# Patient Record
Sex: MALE | Race: BLACK OR AFRICAN AMERICAN
[De-identification: names, ages, dates, MRNs, and addresses within clinical notes are randomized per-mention and may not be internally consistent; named-entity substitution may affect disease eponyms.]

---

## 2019-02-21 ENCOUNTER — HOSPITAL ENCOUNTER (EMERGENCY)
Dept: HOSPITAL 63 - ER | Age: 29
Discharge: ANOTHER HOSPITAL | End: 2019-02-21
Payer: OTHER GOVERNMENT

## 2019-02-21 ENCOUNTER — HOSPITAL ENCOUNTER (INPATIENT)
Dept: HOSPITAL 61 - 4 NORTH | Age: 29
LOS: 2 days | Discharge: HOME | DRG: 343 | End: 2019-02-23
Attending: INTERNAL MEDICINE | Admitting: INTERNAL MEDICINE
Payer: OTHER GOVERNMENT

## 2019-02-21 VITALS — BODY MASS INDEX: 23.1 KG/M2 | HEIGHT: 74 IN | WEIGHT: 180 LBS

## 2019-02-21 VITALS — DIASTOLIC BLOOD PRESSURE: 56 MMHG | SYSTOLIC BLOOD PRESSURE: 112 MMHG

## 2019-02-21 VITALS — DIASTOLIC BLOOD PRESSURE: 73 MMHG | SYSTOLIC BLOOD PRESSURE: 117 MMHG

## 2019-02-21 VITALS — BODY MASS INDEX: 25.03 KG/M2 | WEIGHT: 195 LBS | HEIGHT: 74 IN

## 2019-02-21 VITALS — SYSTOLIC BLOOD PRESSURE: 114 MMHG | DIASTOLIC BLOOD PRESSURE: 68 MMHG

## 2019-02-21 VITALS — DIASTOLIC BLOOD PRESSURE: 58 MMHG | SYSTOLIC BLOOD PRESSURE: 130 MMHG

## 2019-02-21 VITALS — DIASTOLIC BLOOD PRESSURE: 68 MMHG | SYSTOLIC BLOOD PRESSURE: 97 MMHG

## 2019-02-21 VITALS — SYSTOLIC BLOOD PRESSURE: 106 MMHG | DIASTOLIC BLOOD PRESSURE: 55 MMHG

## 2019-02-21 VITALS — SYSTOLIC BLOOD PRESSURE: 122 MMHG | DIASTOLIC BLOOD PRESSURE: 79 MMHG

## 2019-02-21 VITALS — SYSTOLIC BLOOD PRESSURE: 107 MMHG | DIASTOLIC BLOOD PRESSURE: 59 MMHG

## 2019-02-21 VITALS — SYSTOLIC BLOOD PRESSURE: 102 MMHG | DIASTOLIC BLOOD PRESSURE: 52 MMHG

## 2019-02-21 VITALS — DIASTOLIC BLOOD PRESSURE: 75 MMHG | SYSTOLIC BLOOD PRESSURE: 129 MMHG

## 2019-02-21 VITALS — DIASTOLIC BLOOD PRESSURE: 65 MMHG | SYSTOLIC BLOOD PRESSURE: 126 MMHG

## 2019-02-21 DIAGNOSIS — K35.80: Primary | ICD-10-CM

## 2019-02-21 DIAGNOSIS — R79.89: ICD-10-CM

## 2019-02-21 DIAGNOSIS — K38.1: ICD-10-CM

## 2019-02-21 DIAGNOSIS — Z79.899: ICD-10-CM

## 2019-02-21 LAB
ALBUMIN SERPL-MCNC: 4.1 G/DL (ref 3.4–5)
ALP SERPL-CCNC: 39 U/L (ref 46–116)
ALT SERPL-CCNC: 22 U/L (ref 16–63)
AMYLASE SERPL-CCNC: 79 U/L (ref 25–115)
ANION GAP SERPL CALC-SCNC: 8 MMOL/L (ref 6–14)
APTT PPP: YELLOW S
AST SERPL-CCNC: 20 U/L (ref 15–37)
BACTERIA #/AREA URNS HPF: 0 /HPF
BASOPHILS # BLD AUTO: 0.1 X10^3/UL (ref 0–0.2)
BASOPHILS NFR BLD: 1 % (ref 0–3)
BILIRUB DIRECT SERPL-MCNC: 0.2 MG/DL (ref 0–0.2)
BILIRUB SERPL-MCNC: 0.7 MG/DL (ref 0.2–1)
BILIRUB UR QL STRIP: (no result)
CA-I SERPL ISE-MCNC: 13 MG/DL (ref 8–26)
CALCIUM SERPL-MCNC: 9.3 MG/DL (ref 8.5–10.1)
CHLORIDE SERPL-SCNC: 102 MMOL/L (ref 98–107)
CO2 SERPL-SCNC: 30 MMOL/L (ref 21–32)
CREAT SERPL-MCNC: 1.5 MG/DL (ref 0.7–1.3)
EOSINOPHIL NFR BLD: 0.9 X10^3/UL (ref 0–0.7)
EOSINOPHIL NFR BLD: 12 % (ref 0–3)
ERYTHROCYTE [DISTWIDTH] IN BLOOD BY AUTOMATED COUNT: 13.3 % (ref 11.5–14.5)
FIBRINOGEN PPP-MCNC: CLEAR MG/DL
GFR SERPLBLD BASED ON 1.73 SQ M-ARVRAT: 68.4 ML/MIN
GLUCOSE SERPL-MCNC: 97 MG/DL (ref 70–99)
GLUCOSE UR STRIP-MCNC: (no result) MG/DL
HCT VFR BLD CALC: 44.8 % (ref 39–53)
HGB BLD-MCNC: 15 G/DL (ref 13–17.5)
LIPASE: 98 U/L (ref 73–393)
LYMPHOCYTES # BLD: 2 X10^3/UL (ref 1–4.8)
LYMPHOCYTES NFR BLD AUTO: 29 % (ref 24–48)
MCH RBC QN AUTO: 30 PG (ref 25–35)
MCHC RBC AUTO-ENTMCNC: 34 G/DL (ref 31–37)
MCV RBC AUTO: 90 FL (ref 79–100)
MONO #: 0.8 X10^3/UL (ref 0–1.1)
MONOCYTES NFR BLD: 11 % (ref 0–9)
NEUT #: 3.3 X10^3UL (ref 1.8–7.7)
NEUTROPHILS NFR BLD AUTO: 47 % (ref 31–73)
NITRITE UR QL STRIP: (no result)
PLATELET # BLD AUTO: 316 X10^3/UL (ref 140–400)
POTASSIUM SERPL-SCNC: 3.9 MMOL/L (ref 3.5–5.1)
PROT SERPL-MCNC: 8.2 G/DL (ref 6.4–8.2)
RBC # BLD AUTO: 4.98 X10^6/UL (ref 4.3–5.7)
RBC #/AREA URNS HPF: 0 /HPF (ref 0–2)
SODIUM SERPL-SCNC: 140 MMOL/L (ref 136–145)
SP GR UR STRIP: 1.02
SQUAMOUS #/AREA URNS LPF: (no result) /LPF
UROBILINOGEN UR-MCNC: 0.2 MG/DL
WBC # BLD AUTO: 7 X10^3/UL (ref 4–11)
WBC #/AREA URNS HPF: (no result) /HPF (ref 0–4)

## 2019-02-21 PROCEDURE — 96374 THER/PROPH/DIAG INJ IV PUSH: CPT

## 2019-02-21 PROCEDURE — 85025 COMPLETE CBC W/AUTO DIFF WBC: CPT

## 2019-02-21 PROCEDURE — 85610 PROTHROMBIN TIME: CPT

## 2019-02-21 PROCEDURE — 85730 THROMBOPLASTIN TIME PARTIAL: CPT

## 2019-02-21 PROCEDURE — 0DTJ4ZZ RESECTION OF APPENDIX, PERCUTANEOUS ENDOSCOPIC APPROACH: ICD-10-PCS | Performed by: SURGERY

## 2019-02-21 PROCEDURE — 81001 URINALYSIS AUTO W/SCOPE: CPT

## 2019-02-21 PROCEDURE — 82570 ASSAY OF URINE CREATININE: CPT

## 2019-02-21 PROCEDURE — 84156 ASSAY OF PROTEIN URINE: CPT

## 2019-02-21 PROCEDURE — 99285 EMERGENCY DEPT VISIT HI MDM: CPT

## 2019-02-21 PROCEDURE — 88304 TISSUE EXAM BY PATHOLOGIST: CPT

## 2019-02-21 PROCEDURE — 96375 TX/PRO/DX INJ NEW DRUG ADDON: CPT

## 2019-02-21 PROCEDURE — 83690 ASSAY OF LIPASE: CPT

## 2019-02-21 PROCEDURE — 74022 RADEX COMPL AQT ABD SERIES: CPT

## 2019-02-21 PROCEDURE — 82150 ASSAY OF AMYLASE: CPT

## 2019-02-21 PROCEDURE — 74177 CT ABD & PELVIS W/CONTRAST: CPT

## 2019-02-21 PROCEDURE — 36415 COLL VENOUS BLD VENIPUNCTURE: CPT

## 2019-02-21 PROCEDURE — 84300 ASSAY OF URINE SODIUM: CPT

## 2019-02-21 PROCEDURE — 93005 ELECTROCARDIOGRAM TRACING: CPT

## 2019-02-21 PROCEDURE — A7015 AEROSOL MASK USED W NEBULIZE: HCPCS

## 2019-02-21 PROCEDURE — 80048 BASIC METABOLIC PNL TOTAL CA: CPT

## 2019-02-21 PROCEDURE — 80076 HEPATIC FUNCTION PANEL: CPT

## 2019-02-21 RX ADMIN — KETOROLAC TROMETHAMINE SCH MG: 15 INJECTION, SOLUTION INTRAMUSCULAR; INTRAVENOUS at 12:00

## 2019-02-21 RX ADMIN — DEXTROSE, SODIUM CHLORIDE, SODIUM LACTATE, POTASSIUM CHLORIDE, AND CALCIUM CHLORIDE SCH MLS/HR: 5; .6; .31; .03; .02 INJECTION, SOLUTION INTRAVENOUS at 11:28

## 2019-02-21 RX ADMIN — OXYCODONE HYDROCHLORIDE AND ACETAMINOPHEN PRN TAB: 5; 325 TABLET ORAL at 20:57

## 2019-02-21 RX ADMIN — KETOROLAC TROMETHAMINE SCH MG: 15 INJECTION, SOLUTION INTRAMUSCULAR; INTRAVENOUS at 18:47

## 2019-02-21 RX ADMIN — PIPERACILLIN SODIUM AND TAZOBACTAM SODIUM SCH MLS/HR: 3; .375 INJECTION, POWDER, LYOPHILIZED, FOR SOLUTION INTRAVENOUS at 18:47

## 2019-02-21 RX ADMIN — KETOROLAC TROMETHAMINE SCH MG: 15 INJECTION, SOLUTION INTRAMUSCULAR; INTRAVENOUS at 20:55

## 2019-02-21 RX ADMIN — FENTANYL CITRATE PRN MCG: 50 INJECTION INTRAMUSCULAR; INTRAVENOUS at 12:09

## 2019-02-21 RX ADMIN — FENTANYL CITRATE PRN MCG: 50 INJECTION INTRAMUSCULAR; INTRAVENOUS at 12:36

## 2019-02-21 RX ADMIN — PIPERACILLIN SODIUM AND TAZOBACTAM SODIUM SCH MLS/HR: 3; .375 INJECTION, POWDER, LYOPHILIZED, FOR SOLUTION INTRAVENOUS at 10:59

## 2019-02-21 NOTE — NUR
Pt admitted from PACU. A&o X4, VSS, denies pain, lap sites X3 CDI. Completed admission 
assessment. Oriented to room and routines. Tray ordered. Water pitcher filled. Call light 
within reach. Will continue to monitor.

## 2019-02-21 NOTE — RAD
Acute abdomen series with chest, 3 views, 2/21/2019:

 

HISTORY: Right-sided abdominal pain

 

There is gas and stool scattered throughout the colon in a nonspecific 

pattern. No free air is present in the abdomen. There is no evidence of 

organomegaly. 2 coarse calcifications are evident in the right mid pelvis 

compatible with appendicoliths.

 

The heart size is normal. The lungs are clear. There is no evidence of 

pleural fluid.

 

 

IMPRESSION:

1. Right lower quadrant calcifications compatible with appendicoliths

 

2. No acute abdominal abnormality is detected.

 

Electronically signed by: Rick Moritz, MD (2/21/2019 7:39 AM) [facility]

## 2019-02-21 NOTE — PDOC1
History and Physical


Date of Admission


Date of Admission


DATE: 2/21/19 


TIME: 08:51





Identification/Chief Complaint


Chief Complaint


Abdominal pain right lower quadrant





Source


Source:  Patient





History of Present Illness


History of Present Illness


28-year-old male with 2 day history of abdominal pain radiating to the right 

lower quadrant sharp in nature worsening over time. Patient does describe 

nausea no vomiting. Denies any fevers or chills. His never had any surgery. CT 

scan shows dilated appendix with fecalith





Past Medical History


Cardiovascular:  No pertinent hx


Pulmonary:  No pertinent hx


GI:  No pertinent hx


Heme/Onc:  No pertinent hx


Hepatobiliary:  No pertinent hx


Psych:  No pertinent hx


Rheumatologic:  No pertinent hx


ENT:  No pertinent hx


Renal/:  No pertinent hx


Endocrine:  No pertinent hx


Dermatology:  No pertinent hx





Past Surgical History


Past Surgical History:  No pertinent history





Family History


Family History:  No Significant





Social History


Smoke:  No


ALCOHOL:  rare


Drugs:  None





Allergies


Allergies:  


Coded Allergies:  


     No Known Drug Allergies (Unverified , 2/21/19)





ROS


Gastrointestinal:  Yes Nausea, Yes Abdominal Pain





Physical Exam


General:  Alert, Oriented X3, Cooperative, mild distress


HEENT:  Atraumatic, PERRLA, EOMI


Lungs:  Clear to auscultation, Normal air movement


Heart:  RRR, no murmurs


Abdomen:  Normal bowel sounds, Soft, Other (tender to palpation right lower 

quadrant)


Rectal Exam:  not examined


Extremities:  No edema


Skin:  No significant lesion


Neuro:  Normal speech


Psych/Mental Status:  Mental status NL





VTE Prophylaxis Ordered


VTE Prophylaxis Devices:  Yes


VTE Pharmacological Prophylaxi:  Contraindicated





Assessment/Plan


Assessment/Plan


Right lower quadrant abdominal pain with nausea CT scan showing signs 

consistent with acute appendicitis plan for laparoscopic appendectomy today. 

Discussed procedure with the patient including risk benefits possibility of 

open appendectomy.











CINDY SCHMITZ MD Feb 21, 2019 08:54

## 2019-02-21 NOTE — PDOC4
Operative Note


Operative Note


Date: 2/21/2019


Preoperative diagnosis: Acute appendicitis


Operative diagnosis: Same


Surgeon: Billy


Procedure: Laparoscopic appendectomy


Specimen: Appendix


Dictation: Patient is 28-year-old male who is better to the hospital for right 

lower quadrant abdominal pain 24 hours a CT scan showing dilated appendix with 

a fecalith. Procedure of laparoscopic appendectomy was explained to the patient 

detail risk benefits were also discussed including bleeding infection injury to 

intra-abdominal contents possibly necessitating further open operations 

alternatives to this procedure also discussed with the patient who seemed to 

understand and gave both verbal and written consent to have the procedure 

performed. Patient was taken to the operative reports the supine position 

general anesthesia was initiated once patient was sleep and intubated his 

abdomen was prepped and draped usual sterile fashion using ChloraPrep and area 

just above the umbilicus was injected with quarter percent Marcaine with 

epinephrine and incision was made with 11 blade scalpel and a varies needle was 

placed within the abdomen creating a pneumoperitoneum once this was completed 

12 mm port was placed and a 5 mm camera was placed within the abdomen abdomen 

was inspected the appendix was visualized the right lower quadrant which 

appeared to be quite dilated and somewhat inflamed. A 5 mm port was placed in 

the midline low pelvis and a second 5 OmegaPort was placed in the right mid 

abdomen under direct visualization the appendix was grasped and retracted 

towards the anterior abdominal wall window was propagated and base of the 

appendix through the mesoappendix with a [place] dissector once this was 

complete an Endo DAVID stapler using a blue load was used to staple and transect 

the base of the appendix. An stabled and transected with the same Endo DAVID. The 

appendix was placed in the Endo Catch bag and removed from the umbilicus right 

lower quadrant pelvis were irrigated and suctioned dry hemostased and be 

appropriate and the pneumoperitoneum was reduced all ports were removed fascial 

defect at the umbilicus was closed with a figure-of-eight 0 Vicryl suture and 

the skin was reapproximated all port sites for septic or Monocryl Mastisol Steri

-Strips and Band-Aids were applied as dressings. Patient was awakened and 

extubated in the operating room taken to recovery in stable condition all 

sponge instrument needle counts listed as correct isthmic blood loss 5 mL











CINDY SCHMITZ MD Feb 21, 2019 11:28

## 2019-02-21 NOTE — PDOC1
History and Physical


Date of Admission


Date of Admission


DATE: 2/21/19 


TIME: 18:01





Identification/Chief Complaint


Chief Complaint


abd pain





Source


Source:  Chart review, Patient





History of Present Illness


History of Present Illness





Mr. Colvin, 28-year-old male, active [employer],  with 2 day history of 

abdominal pain radiating to the right lower quadrant sharp in nature worsening 

over time.


taken urgently to the OR with Dr. Cervantes due to CT from OSH with dilated 

appendix with fecalith





Past Medical History


Cardiovascular:  No pertinent hx


Pulmonary:  No pertinent hx


GI:  No pertinent hx


Heme/Onc:  No pertinent hx


Hepatobiliary:  No pertinent hx


Psych:  No pertinent hx


Rheumatologic:  No pertinent hx


ENT:  No pertinent hx


Renal/:  No pertinent hx


Endocrine:  No pertinent hx


Dermatology:  No pertinent hx





Past Surgical History


Past Surgical History:  No pertinent history





Family History


Family History:  No Significant





Social History


Smoke:  No


ALCOHOL:  rare


Drugs:  None





Current Medications


Current Medications





Current Medications


Piperacillin Sod/ Tazobactam Sod 3.375 gm/Sodium Chloride 50 ml @  100 mls/hr 

Q6HRS IV  Last administered on 2/21/19at 10:59;  Start 2/21/19 at 10:00


Ondansetron HCl (Zofran) 4 mg PRN Q6HRS  PRN IV NAUSEA/VOMITING;  Start 2/21/19 

at 09:15;  Stop 2/21/19 at 18:00;  Status DC


Fentanyl Citrate (Fentanyl 2ml Vial) 25 mcg PRN Q5MIN  PRN IV MILD PAIN;  Start 

2/21/19 at 09:15;  Stop 2/21/19 at 18:00;  Status DC


Fentanyl Citrate (Fentanyl 2ml Vial) 50 mcg PRN Q5MIN  PRN IV MODERATE TO 

SEVERE PAIN Last administered on 2/21/19at 12:36;  Start 2/21/19 at 09:15;  

Stop 2/21/19 at 18:00;  Status DC


Morphine Sulfate (Morphine Sulfate) 1 mg PRN Q10MIN  PRN IV SEVERE PAIN;  Start 

2/21/19 at 09:15;  Stop 2/21/19 at 18:00;  Status DC


Ringer's Solution 1,000 ml @  30 mls/hr Q24H IV ;  Start 2/21/19 at 09:08;  

Stop 2/21/19 at 21:07


Lidocaine HCl (Xylocaine-Mpf 1% 2ml Vial) 2 ml 1X PRN  PRN ID IV START;  Start 2 /21/19 at 09:15;  Stop 2/21/19 at 18:00;  Status DC


Hydromorphone HCl (Dilaudid) 0.5 mg PRN Q10MIN  PRN IV SEV PAIN, Second choice;

  Start 2/21/19 at 09:15;  Stop 2/21/19 at 18:00;  Status DC


Prochlorperazine Edisylate (Compazine) 5 mg PACU PRN  PRN IV NAUSEA, MRX1;  

Start 2/21/19 at 09:15;  Stop 2/21/19 at 18:00;  Status DC


Lidocaine HCl (Lidocaine Pf 2% Vial) 5 ml STK-MED ONCE .ROUTE ;  Start 2/21/19 

at 09:28;  Stop 2/21/19 at 09:29;  Status DC


Propofol 20 ml @ As Directed STK-MED ONCE IV ;  Start 2/21/19 at 09:28;  Stop 2/ 21/19 at 09:29;  Status DC


Rocuronium Bromide (Zemuron) 50 mg STK-MED ONCE .ROUTE ;  Start 2/21/19 at 09:29

;  Stop 2/21/19 at 09:30;  Status DC


Fentanyl Citrate (Fentanyl 2ml Vial) 100 mcg STK-MED ONCE .ROUTE ;  Start 2/21/ 19 at 09:29;  Stop 2/21/19 at 09:30;  Status DC


Bupivacaine HCl/ Epinephrine Bitart (Sensorcain-Mpf Epi 0.5%-1:[id number]) 30 ml STK

-MED ONCE .ROUTE  Last administered on 2/21/19at 11:07;  Start 2/21/19 at 10:42

;  Stop 2/21/19 at 10:43;  Status DC


Dexamethasone Sodium Phosphate (Decadron) 20 mg STK-MED ONCE .ROUTE ;  Start 2/ 21/19 at 11:02;  Stop 2/21/19 at 11:03;  Status DC


Ondansetron HCl (Zofran) 4 mg STK-MED ONCE .ROUTE ;  Start 2/21/19 at 11:02;  

Stop 2/21/19 at 11:03;  Status DC


Desflurane (Suprane) 15 ml STK-MED ONCE IH ;  Start 2/21/19 at 11:02;  Stop 2/21 /19 at 11:03;  Status DC


Neostigmine Methylsulfate (Bloxiverz) 10 mg STK-MED ONCE .ROUTE ;  Start 2/21/ 19 at 11:04;  Stop 2/21/19 at 11:05;  Status DC


Glycopyrrolate (Robinul) 1 mg STK-MED ONCE .ROUTE ;  Start 2/21/19 at 11:04;  

Stop 2/21/19 at 11:05;  Status DC


Fentanyl Citrate (Fentanyl 2ml Vial) 100 mcg STK-MED ONCE .ROUTE ;  Start 2/21/ 19 at 11:28;  Stop 2/21/19 at 11:29;  Status DC


Prochlorperazine Edisylate (Compazine) 10 mg STK-MED ONCE .ROUTE ;  Start 2/21/ 19 at 11:28;  Stop 2/21/19 at 11:29;  Status DC


Sodium Chloride (Normal Saline Flush) 3 ml QSHIFT  PRN IV AFTER MEDS AND BLOOD 

DRAWS;  Start 2/21/19 at 11:30


Morphine Sulfate (Morphine Sulfate) 2 mg PRN Q3HRS  PRN IV PAIN;  Start 2/21/19 

at 11:30


Oxycodone/ Acetaminophen (Percocet 5/325) 1 tab PRN Q4HRS  PRN PO MILD PAIN, 

1ST CHOICE;  Start 2/21/19 at 11:30


Oxycodone/ Acetaminophen (Percocet 5/325) 2 tab PRN Q4HRS  PRN PO MODERATE PAIN

, SEVERE PAIN;  Start 2/21/19 at 11:30


Ketorolac Tromethamine (Toradol 15mg Vial) 15 mg Q6HRS IV ;  Start 2/21/19 at 12

:00;  Stop 2/23/19 at 11:59


Ondansetron HCl (Zofran) 4 mg PRN Q6HRS  PRN IV NAUSEA, 1ST CHOICE;  Start 2/21/ 19 at 11:30


Dextrose/Lactated Ringer's 1,000 ml @  75 mls/hr [id number] IV ;  Start 2/21/19 at 

11:28


Piperacillin Sod/ Tazobactam Sod 3.375 gm/Sodium Chloride 50 ml @  100 mls/hr 

Q6HRS IV ;  Start 2/21/19 at 12:00;  Status Cancel





Allergies


Allergies:  


Coded Allergies:  


     No Known Drug Allergies (Unverified , 2/21/19)





ROS


General:  No: Chills, Night Sweats, Fatigue, Malaise, Appetite, Other


PSYCHOLOGICAL ROS:  No: Anxiety, Behavioral Disorder, Concentration difficultie

, Decreased libido, Depression, Disorientation, Hallucinations, Hostility, 

Irritablity, Memory difficulties, Mood Swings, Obsessive thoughts, Physical 

abuse, Sexual abuse, Sleep disturbances, Suicidal ideation, Other


Eyes:  No Blurry vision, No Decreased vision, No Double vision, No Dry eyes, No 

Excessive tearing, No Eye Pain, No Itchy Eyes, No Loss of vision, No Photophobia

, No Scotomata, No Uses contacts, No Uses glasses, No Other


HEENT:  No: Heacaches, Visual Changes, Hearing change, Nasal congestion, Nasal 

discharge, Oral lesions, Sinus pain, Sore Throat, Epistaxis, Sneezing, Snoring, 

Tinnitus, Vertigo, Vocal changes, Other


Respiratory:  No: Cough, Hemoptysis, Orthopnea, Pleuritic Pain, Shortness of 

breath, SOB with excertion, Sputum Changes, Stridor, Tachypnea, Wheezing, Other


Cardiovascular:  No Chest Pain, No Palpitations, No Orthopnea, No Paroxysmal 

Noc. Dyspnea, No Edema, No Lt Headedness, No Other


Gastrointestinal:  No Nausea, No Vomiting, No Abdominal Pain, No Diarrhea, No 

Constipation, No Melena, No Hematochezia, No Other


Genitourinary:  No Dysuria, No Frequency, No Incontinence, No Hematuria, No 

Retention, No Discharge, No Urgency, No Pain, No Flank Pain, No Other, No , No 

, No , No , No , No , No 


Musculoskeletal:  No Gait Disturbance, No Joint Pain, No Joint Stiffness, No 

Joint Swelling, No Muscle Pain, No Muscular Weakness, No Pain In:, No Swelling 

In:, No Other


Skin:  No Dry Skin, No Eczema, No Hair Changes, No Lumps, No Mole Changes, No 

Mottling, No Nail Changes, No Pruritus, No Rash, No Skin Lesion Changes, No 

Other, No Acne





Physical Exam


General:  Alert, Oriented X3, Cooperative, No acute distress


HEENT:  Atraumatic


Lungs:  Normal air movement


Extremities:  No clubbing, No cyanosis, No edema, Normal pulses


Neuro:  Sensation intact, Cranial nerves 3-12 NL


Psych/Mental Status:  Mood NL





Vitals


Vitals





Vital Signs








  Date Time  Temp Pulse Resp B/P (MAP) Pulse Ox O2 Delivery O2 Flow Rate FiO2


 


2/21/19 16:00  55 20 130/58 (82) 96 Room Air  


 


2/21/19 13:17 98.4       





 98.4       


 


2/21/19 12:36       10.0 











VTE Prophylaxis Ordered


VTE Prophylaxis Devices:  Yes


VTE Pharmacological Prophylaxi:  Contraindicated





Assessment/Plan


Assessment/Plan


acute appendicitis,  to OR,   


obs











VERENICE BISHOP MD Feb 21, 2019 18:02

## 2019-02-21 NOTE — RAD
INDICATION: Right sided abdominal pain, nausea<BR>Gave Omni 300 75ml iv 

and Omni 240 30ml oral

 

COMPARISON: None.

 

TECHNIQUE: 

 

Axial CT images were obtained through the abdomen and pelvis with 

intravenous contrast.  

 

One or more of the following individualized dose reduction techniques were

utilized for this examination:  1. Automated exposure control;  2. 

Adjustment of the mA and/or kV according to patient size;  3. Use of 

iterative reconstruction technique.

 

FINDINGS:

 

Chest Base: Partially imaged without gross abnormality.

Vessels: No abdominal aortic aneurysm.

Liver/Biliary: No intrahepatic biliary duct dilation.

Pancreas: No peripancreatic edema.

Spleen: Normal.

Kidneys/Adrenal: No hydronephrosis.

Bladder: No definite adjacent inflammation.

GI: Small amount of free fluid within the pelvis.

Blind-ending tubular structure right lower quadrant of the abdomen 

measuring up to 16 mm with calcific density structure within. Prominent 

enhancement of the wall. Cannot evaluate for adjacent inflammatory changes

given the lack of adjacent fat.

There are some degenerative changes of the spine with mild osteophyte 

formation as well as disc protrusions.

 

IMPRESSION:

 

1.   Blind-ending tubular structure right lower quadrant which is 

concerning for a dilated appendix with appendicoliths within. Cannot 

evaluate for adjacent inflammatory changes given the lack of fat within 

the region but this is concerning for appendicitis. There is also some 

free fluid within the pelvis which is an abnormal finding for male 

patients.

 

Electronically signed by: Gilbert Keenan MD (2/21/2019 7:25 AM) [facility]

## 2019-02-21 NOTE — ED.ADGEN
Past History


Past Medical History:  No Pertinent History


 (JAXON COBOS MD)


Past Surgical History


Circumcision


 (JAXON COBOS MD)


Alcohol Use:  None


Drug Use:  None


 (JAXON COBOS MD)





Adult General


Chief Complaint


Chief Complaint


".. I ve been letitia hurting since yesterday... I ve been nauseate.. want to 

throw up.. pain letitia down here on the right... "


 (JAXON COBOS MD)





HPI


HPI





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  

with complaints of abdomen pain primarily on right lower quadrant.  Pt. has 

complaints of nausea.   Pt. states he last ate chicken last night before going 

to bed.  Patient denies any tarry or dark stools. No recent travel. Was 

assigned to [place] for approximately 10 months and return state side in November 

of this past year.  No hx of prior GI problems.  Pt.   has had  hx 1 previous 

chlamydia that was treated.  No recent discharge or concerns.   Patient denies 

any trauma. Patient up-to-date with vaccinations. No specific ill contacts.  No 

family hx of colitis or renal stones.  


 (JAXON COBOS MD)





Review of Systems


Review of Systems





Constitutional: Denies fever or chills []


Eyes: Denies change in visual acuity, redness, or eye pain []


HENT: Denies nasal congestion or sore throat []


Respiratory: Denies cough or shortness of breath []


Cardiovascular: No additional information not addressed in HPI []


GI: Complaints of right lower abdominal pain, nausea,. Patient denies vomiting, 

bloody stools or diarrhea []


: Denies dysuria or hematuria []


Musculoskeletal: Denies back pain or joint pain []


Integument: Denies rash or skin lesions []


Neurologic: Denies headache, focal weakness or sensory changes []


Endocrine: Denies polyuria or polydipsia []





All other systems were reviewed and found to be within normal limits, except as 

documented in this note.


 (JAXON COBOS MD)





Family History


Family History


Noncontributory


 (JAXON COBOS MD)





Current Medications


Current Medications





Current Medications








 Medications


  (Trade)  Dose


 Ordered  Sig/Katerina  Start Time


 Stop Time Status Last Admin


Dose Admin


 


 Famotidine


  (Pepcid Vial)  20 mg  1X  ONCE  2/21/19 05:15


 2/21/19 05:50 DC 2/21/19 05:54


20 MG


 


 Info


  (Do NOT chart on


 this entry -- for


 MONITORING)  1 each  PRN DAILY  PRN  2/21/19 06:00


 2/23/19 05:59   





 


 Iohexol


  (Omnipaque 240


 Mg/ml)  50 ml  1X  ONCE  2/21/19 05:45


 2/21/19 05:50 DC 2/21/19 06:27


50 ML


 


 Iohexol


  (Omnipaque 300


 Mg/ml)  75 ml  1X  ONCE  2/21/19 05:45


 2/21/19 05:50 DC 2/21/19 06:27


75 ML


 


 Ketorolac


 Tromethamine


  (Toradol 30mg


 Vial)  30 mg  1X  ONCE  2/21/19 05:15


 2/21/19 05:50 DC 2/21/19 05:55


30 MG


 


 Lactated Ringer's  1,000 ml @ 


 1,000 mls/hr  Q1H  2/21/19 05:02


 2/21/19 06:01 DC 2/21/19 05:34


1,000 MLS/HR


 


 Ondansetron HCl


  (Zofran)  8 mg  1X  ONCE  2/21/19 05:15


 2/21/19 05:50 DC 2/21/19 05:54


8 MG





 (KINGSLEY MANZO DO)


Current Medications


Noncontributory


 (JAXON COBOS MD)





Allergies


Allergies





Allergies








Coded Allergies Type Severity Reaction Last Updated Verified


 


  No Known Drug Allergies    2/21/19 No





 (KINGSLEY MANZO DO)


Allergies


No known drug allergies


 (JAXON COBOS MD)





Physical Exam


Physical Exam





Constitutional: Well developed, well nourished, moderately acute distress, non-

toxic appearance. []


HENT: Normocephalic, atraumatic, bilateral external ears normal, oropharynx 

moist, no oral exudates, nose normal. []


Eyes: PERRLA, EOMI, conjunctiva normal, no discharge. [] 


Neck: Normal range of motion, no tenderness, supple, no stridor. [] 


Cardiovascular: Bradycardia Heart rate regular rhythm, no murmur []


Lungs & Thorax:  Bilateral breath sounds equal apex on auscultation []


Abdomen: Bowel sounds normal, soft, some right lower quadrant tenderness, no 

masses, no pulsatile masses. [] Some rebound to right lower quadrant.  

Circumcised male. Testicles nontender. No penile discharge.


Skin: Warm, dry, no erythema, no rash. [] Multiple tattoos 


Back: No tenderness, no CVA tenderness. [] 


Extremities: No tenderness, no cyanosis, no clubbing, ROM intact, no edema. [] 

Mild psoas on right.  Muscular.


Neurologic: Alert and oriented X 3, normal motor function, normal sensory 

function, no focal deficits noted. []


Psychologic: Affect anxious, judgement normal, mood normal. []


 (JAXON COBOS MD)





Current Patient Data


Vital Signs





 Vital Signs








  Date Time  Temp Pulse Resp B/P (MAP) Pulse Ox O2 Delivery O2 Flow Rate FiO2


 


2/21/19 07:00  47 18 129/75 (93) 100 Room Air  


 


2/21/19 05:10 98.5       





 ([facility])


Lab Results





 Laboratory Tests








Test


 2/21/19


05:10 2/21/19


05:15


 


Urine Collection Type Unknown   


 


Urine Color Yellow   


 


Urine Clarity Clear   


 


Urine pH 7.0   


 


Urine Specific Gravity 1.020   


 


Urine Protein


 Neg


(NEG-TRACE) 





 


Urine Glucose (UA)


 Neg mg/dL


(NEG) 





 


Urine Ketones (Stick)


 Neg mg/dL


(NEG) 





 


Urine Blood Neg (NEG)   


 


Urine Nitrite Neg (NEG)   


 


Urine Bilirubin Neg (NEG)   


 


Urine Urobilinogen Dipstick


 0.2 mg/dL (0.2


mg/dL) 





 


Urine Leukocyte Esterase Neg (NEG)   


 


Urine RBC 0 /HPF (0-2)   


 


Urine WBC


 Rare /HPF


(0-4) 





 


Urine Squamous Epithelial


Cells Occ /LPF  


 





 


Urine Bacteria


 0 /HPF (0-FEW)


 





 


Urine Mucus Slight /LPF   


 


White Blood Count


 


 7.0 x10^3/uL


(4.0-11.0)


 


Red Blood Count


 


 4.98 x10^6/uL


(4.30-5.70)


 


Hemoglobin


 


 15.0 g/dL


(13.0-17.5)


 


Hematocrit


 


 44.8 %


(39.0-53.0)


 


Mean Corpuscular Volume


 


 90 fL ()





 


Mean Corpuscular Hemoglobin  30 pg (25-35)  


 


Mean Corpuscular Hemoglobin


Concent 


 34 g/dL


(31-37)


 


Red Cell Distribution Width


 


 13.3 %


(11.5-14.5)


 


Platelet Count


 


 316 x10^3/uL


(140-400)


 


Neutrophils (%) (Auto)  47 % (31-73)  


 


Lymphocytes (%) (Auto)  29 % (24-48)  


 


Monocytes (%) (Auto)  11 % (0-9)  H


 


Eosinophils (%) (Auto)  12 % (0-3)  H


 


Basophils (%) (Auto)  1 % (0-3)  


 


Neutrophils # (Auto)


 


 3.3 x10^3uL


(1.8-7.7)


 


Lymphocytes # (Auto)


 


 2.0 x10^3/uL


(1.0-4.8)


 


Monocytes # (Auto)


 


 0.8 x10^3/uL


(0.0-1.1)


 


Eosinophils # (Auto)


 


 0.9 x10^3/uL


(0.0-0.7)  H


 


Basophils # (Auto)


 


 0.1 x10^3/uL


(0.0-0.2)


 


Prothrombin Time


 


 10.7 SEC


(9.4-11.4)


 


Prothrombin Time INR  1.1 (0.9-1.1)  


 


PTT


 


 26 SEC (23-33)





 


Sodium Level


 


 140 mmol/L


(136-145)


 


Potassium Level


 


 3.9 mmol/L


(3.5-5.1)


 


Chloride Level


 


 102 mmol/L


()


 


Carbon Dioxide Level


 


 30 mmol/L


(21-32)


 


Anion Gap  8 (6-14)  


 


Blood Urea Nitrogen


 


 13 mg/dL


(8-26)


 


Creatinine


 


 1.5 mg/dL


(0.7-1.3)  H


 


Estimated GFR


(Cockcroft-Gault) 


 68.4  





 


Glucose Level


 


 97 mg/dL


(70-99)


 


Calcium Level


 


 9.3 mg/dL


(8.5-10.1)


 


Total Bilirubin


 


 0.7 mg/dL


(0.2-1.0)


 


Direct Bilirubin


 


 0.2 mg/dL


(0.0-0.2)


 


Aspartate Amino Transferase


(AST) 


 20 U/L (15-37)





 


Alanine Aminotransferase (ALT)


 


 22 U/L (16-63)





 


Alkaline Phosphatase


 


 39 U/L


()  L


 


Total Protein


 


 8.2 g/dL


(6.4-8.2)


 


Albumin


 


 4.1 g/dL


(3.4-5.0)


 


Amylase Level


 


 79 U/L


()


 


Lipase


 


 98 U/L


()





 (ABBY,KINGSLEY DO)


Lab Results





 Laboratory Tests








Test


 2/21/19


05:10 2/21/19


05:15


 


Urine Collection Type Unknown   


 


Urine Color Yellow   


 


Urine Clarity Clear   


 


Urine pH 7.0   


 


Urine Specific Gravity 1.020   


 


Urine Protein


 Neg


(NEG-TRACE) 





 


Urine Glucose (UA)


 Neg mg/dL


(NEG) 





 


Urine Ketones (Stick)


 Neg mg/dL


(NEG) 





 


Urine Blood Neg (NEG)   


 


Urine Nitrite Neg (NEG)   


 


Urine Bilirubin Neg (NEG)   


 


Urine Urobilinogen Dipstick


 0.2 mg/dL (0.2


mg/dL) 





 


Urine Leukocyte Esterase Neg (NEG)   


 


Urine RBC 0 /HPF (0-2)   


 


Urine WBC


 Rare /HPF


(0-4) 





 


Urine Squamous Epithelial


Cells Occ /LPF  


 





 


Urine Bacteria


 0 /HPF (0-FEW)


 





 


Urine Mucus Slight /LPF   


 


White Blood Count


 


 7.0 x10^3/uL


(4.0-11.0)


 


Red Blood Count


 


 4.98 x10^6/uL


(4.30-5.70)


 


Hemoglobin


 


 15.0 g/dL


(13.0-17.5)


 


Hematocrit


 


 44.8 %


(39.0-53.0)


 


Mean Corpuscular Volume


 


 90 fL ()





 


Mean Corpuscular Hemoglobin  30 pg (25-35)  


 


Mean Corpuscular Hemoglobin


Concent 


 34 g/dL


(31-37)


 


Red Cell Distribution Width


 


 13.3 %


(11.5-14.5)


 


Platelet Count


 


 316 x10^3/uL


(140-400)


 


Neutrophils (%) (Auto)  47 % (31-73)  


 


Lymphocytes (%) (Auto)  29 % (24-48)  


 


Monocytes (%) (Auto)  11 % (0-9)  H


 


Eosinophils (%) (Auto)  12 % (0-3)  H


 


Basophils (%) (Auto)  1 % (0-3)  


 


Neutrophils # (Auto)


 


 3.3 x10^3uL


(1.8-7.7)


 


Lymphocytes # (Auto)


 


 2.0 x10^3/uL


(1.0-4.8)


 


Monocytes # (Auto)


 


 0.8 x10^3/uL


(0.0-1.1)


 


Eosinophils # (Auto)


 


 0.9 x10^3/uL


(0.0-0.7)  H


 


Basophils # (Auto)


 


 0.1 x10^3/uL


(0.0-0.2)


 


Prothrombin Time


 


 10.7 SEC


(9.4-11.4)


 


Prothrombin Time INR  1.1 (0.9-1.1)  


 


PTT


 


 26 SEC (23-33)





 


Sodium Level


 


 140 mmol/L


(136-145)


 


Potassium Level


 


 3.9 mmol/L


(3.5-5.1)


 


Chloride Level


 


 102 mmol/L


()


 


Carbon Dioxide Level


 


 30 mmol/L


(21-32)


 


Anion Gap  8 (6-14)  


 


Blood Urea Nitrogen


 


 13 mg/dL


(8-26)


 


Creatinine


 


 1.5 mg/dL


(0.7-1.3)  H


 


Estimated GFR


(Cockcroft-Gault) 


 68.4  





 


Glucose Level


 


 97 mg/dL


(70-99)


 


Calcium Level


 


 9.3 mg/dL


(8.5-10.1)


 


Total Bilirubin


 


 0.7 mg/dL


(0.2-1.0)


 


Direct Bilirubin


 


 0.2 mg/dL


(0.0-0.2)


 


Aspartate Amino Transferase


(AST) 


 20 U/L (15-37)





 


Alanine Aminotransferase (ALT)


 


 22 U/L (16-63)





 


Alkaline Phosphatase


 


 39 U/L


()  L


 


Total Protein


 


 8.2 g/dL


(6.4-8.2)


 


Albumin


 


 4.1 g/dL


(3.4-5.0)


 


Amylase Level


 


 79 U/L


()


 


Lipase


 


 98 U/L


()








 (JAXON COBOS MD)





EKG


EKG


My interpretation EKG shows a sinus bradycardia at 87 bpm. There is very mild 

right bundle-branch block. But no findings acute STEMI of contralateral changes 

[]


 (JAXON COBOS MD)





Radiology/Procedures


Radiology/Procedures


My interpretation of abdomen film shows no acute cardiopulmonary findings. No 

free air in the diaphragm. Nonspecific bowel gas pattern .[]


 (JAXON COBOS MD)


Radiology/Procedures


Axial CT images were obtained through the abdomen and pelvis with 


intravenous contrast.  


 


One or more of the following individualized dose reduction techniques were


utilized for this examination:  1. Automated exposure control;  2. 


Adjustment of the mA and/or kV according to patient size;  3. Use of 


iterative reconstruction technique.


 


FINDINGS:


 


Chest Base: Partially imaged without gross abnormality.


Vessels: No abdominal aortic aneurysm.


Liver/Biliary: No intrahepatic biliary duct dilation.


Pancreas: No peripancreatic edema.


Spleen: Normal.


Kidneys/Adrenal: No hydronephrosis.


Bladder: No definite adjacent inflammation.


GI: Small amount of free fluid within the pelvis.


Blind-ending tubular structure right lower quadrant of the abdomen 


measuring up to 16 mm with calcific density structure within. Prominent 


enhancement of the wall. Cannot evaluate for adjacent inflammatory changes


given the lack of adjacent fat.


There are some degenerative changes of the spine with mild osteophyte 


formation as well as disc protrusions.


 


IMPRESSION:


 


1.   Blind-ending tubular structure right lower quadrant which is 


concerning for a dilated appendix with appendicoliths within. Cannot 


evaluate for adjacent inflammatory changes given the lack of fat within 


the region but this is concerning for appendicitis. There is also some 


free fluid within the pelvis which is an abnormal finding for male 


patients.


 


Electronically signed by: Gilbert Keenan MD (2/21/2019 7:25 AM) [facility]


 (KINGSLEY MANZO DO)





Course & Med Decision Making


Course & Med Decision Making


Pertinent Labs and Imaging studies reviewed. (See chart for details). 





CT and Labs pending at shift change.  Check pt. to Dr. Manzo





[]


 (JAXON COBOS MD)


Course & Med Decision Making


Dr. Manzo's note


Received patient at 6 AM, agree with previous H&P. Patient has been able to 

tolerate oral contrast and at 6:30 is still awaiting CT scan of his abdomen and 

pelvis.





At approximately 725 called by radiology that patient has what appears to be an 

11 mm appendix with appendicoliths however he has very little intrinsic fat so 

unable to see if there is any inflammation. Due to this and the classic story 

for appendicitis, consultation was made with the surgical service, Dr. Cervantes, 

at [facility] and he graciously accepted the patient.


 (KINGSLEY MNAZO DO)


Final Impression


Final Impression


1. Abdomen pain []


 (JAXON COBOS MD)


Final Impression


Acute appendicitis


 (KINGSLEY MANZO DO)


Dragon Disclaimer


Dragon Disclaimer


This electronic medical record was generated, in whole or in part, using a 

voice recognition dictation system.


 (JAXON COBOS MD)











JAXON COBOS MD Feb 21, 2019 04:55


KINGSLEY MANZO DO Feb 21, 2019 06:34

## 2019-02-21 NOTE — EKG
[facility] [address], [place] [address]

Test Date:    2019               Test Time:    05:32:03

Pat Name:     YAZMIN MORRIS            Department:   

Patient ID:   [id number]           Room:          

Gender:       M                        Technician:   

:          [id number]               Requested By: JAXON COBOS

Order Number: [id number]            Reading MD:   Anam Rosario

                                 Measurements

Intervals                              Axis          

Rate:         57                       P:            45

WV:           152                      QRS:          89

QRSD:         84                       T:            22

QT:           390                                    

QTc:          382                                    

                           Interpretive Statements

SINUS RHYTHM

INCOMPLETE RIGHT BUNDLE BRANCH BLOCK





Electronically Signed On 2019 10:41:17 CST by Anam Rosario

## 2019-02-22 VITALS — SYSTOLIC BLOOD PRESSURE: 125 MMHG | DIASTOLIC BLOOD PRESSURE: 44 MMHG

## 2019-02-22 VITALS — DIASTOLIC BLOOD PRESSURE: 66 MMHG | SYSTOLIC BLOOD PRESSURE: 113 MMHG

## 2019-02-22 VITALS — DIASTOLIC BLOOD PRESSURE: 74 MMHG | SYSTOLIC BLOOD PRESSURE: 115 MMHG

## 2019-02-22 VITALS — DIASTOLIC BLOOD PRESSURE: 57 MMHG | SYSTOLIC BLOOD PRESSURE: 119 MMHG

## 2019-02-22 VITALS — SYSTOLIC BLOOD PRESSURE: 122 MMHG | DIASTOLIC BLOOD PRESSURE: 80 MMHG

## 2019-02-22 VITALS — SYSTOLIC BLOOD PRESSURE: 125 MMHG | DIASTOLIC BLOOD PRESSURE: 79 MMHG

## 2019-02-22 LAB
BASOPHILS # BLD AUTO: 0 X10^3/UL (ref 0–0.2)
BASOPHILS NFR BLD: 0 % (ref 0–3)
EOSINOPHIL NFR BLD: 0 % (ref 0–3)
EOSINOPHIL NFR BLD: 0 X10^3/UL (ref 0–0.7)
ERYTHROCYTE [DISTWIDTH] IN BLOOD BY AUTOMATED COUNT: 13.2 % (ref 11.5–14.5)
HCT VFR BLD CALC: 39.5 % (ref 39–53)
HGB BLD-MCNC: 13 G/DL (ref 13–17.5)
LYMPHOCYTES # BLD: 1.1 X10^3/UL (ref 1–4.8)
LYMPHOCYTES NFR BLD AUTO: 8 % (ref 24–48)
MCH RBC QN AUTO: 30 PG (ref 25–35)
MCHC RBC AUTO-ENTMCNC: 33 G/DL (ref 31–37)
MCV RBC AUTO: 90 FL (ref 79–100)
MONO #: 1 X10^3/UL (ref 0–1.1)
MONOCYTES NFR BLD: 7 % (ref 0–9)
NEUT #: 12.2 X10^3UL (ref 1.8–7.7)
NEUTROPHILS NFR BLD AUTO: 85 % (ref 31–73)
PLATELET # BLD AUTO: 272 X10^3/UL (ref 140–400)
RBC # BLD AUTO: 4.39 X10^6/UL (ref 4.3–5.7)
WBC # BLD AUTO: 14.4 X10^3/UL (ref 4–11)

## 2019-02-22 RX ADMIN — Medication SCH CAP: at 20:46

## 2019-02-22 RX ADMIN — KETOROLAC TROMETHAMINE SCH MG: 15 INJECTION, SOLUTION INTRAMUSCULAR; INTRAVENOUS at 18:18

## 2019-02-22 RX ADMIN — PIPERACILLIN SODIUM AND TAZOBACTAM SODIUM SCH MLS/HR: 3; .375 INJECTION, POWDER, LYOPHILIZED, FOR SOLUTION INTRAVENOUS at 12:13

## 2019-02-22 RX ADMIN — OXYCODONE HYDROCHLORIDE AND ACETAMINOPHEN PRN TAB: 5; 325 TABLET ORAL at 09:21

## 2019-02-22 RX ADMIN — DEXTROSE, SODIUM CHLORIDE, SODIUM LACTATE, POTASSIUM CHLORIDE, AND CALCIUM CHLORIDE SCH MLS/HR: 5; .6; .31; .03; .02 INJECTION, SOLUTION INTRAVENOUS at 00:48

## 2019-02-22 RX ADMIN — KETOROLAC TROMETHAMINE SCH MG: 15 INJECTION, SOLUTION INTRAMUSCULAR; INTRAVENOUS at 05:59

## 2019-02-22 RX ADMIN — OXYCODONE HYDROCHLORIDE AND ACETAMINOPHEN PRN TAB: 5; 325 TABLET ORAL at 20:49

## 2019-02-22 RX ADMIN — DEXTROSE, SODIUM CHLORIDE, SODIUM LACTATE, POTASSIUM CHLORIDE, AND CALCIUM CHLORIDE SCH MLS/HR: 5; .6; .31; .03; .02 INJECTION, SOLUTION INTRAVENOUS at 14:08

## 2019-02-22 RX ADMIN — PIPERACILLIN SODIUM AND TAZOBACTAM SODIUM SCH MLS/HR: 3; .375 INJECTION, POWDER, LYOPHILIZED, FOR SOLUTION INTRAVENOUS at 00:56

## 2019-02-22 RX ADMIN — KETOROLAC TROMETHAMINE SCH MG: 15 INJECTION, SOLUTION INTRAMUSCULAR; INTRAVENOUS at 12:11

## 2019-02-22 RX ADMIN — PIPERACILLIN SODIUM AND TAZOBACTAM SODIUM SCH MLS/HR: 3; .375 INJECTION, POWDER, LYOPHILIZED, FOR SOLUTION INTRAVENOUS at 05:56

## 2019-02-22 RX ADMIN — PIPERACILLIN SODIUM AND TAZOBACTAM SODIUM SCH MLS/HR: 3; .375 INJECTION, POWDER, LYOPHILIZED, FOR SOLUTION INTRAVENOUS at 18:16

## 2019-02-22 NOTE — PATHOLOGY
[facility] Accession Number: [id number]

.                                                                01

Material submitted:                                        .

APPENDIX

.                                                                01

Clinical history:                                          .

Appendicitis.

.                                                                02

**********************************************************************

Diagnosis:

Appendix, appendectomy:

- Acute appendicitis.

(SK/db; 2/22/2019)

LBQ/02/22/2019

**********************************************************************

.                                                                02

Electronically signed:                                     .

Lalit Huddleston MD, Pathologist

NPI- [id number]

.                                                                01

Gross description:                                         .

Received in formalin labeled "Vinicius, Anton, appendix" is an appendectomy

specimen measuring 7.3 cm in length and ranging from 1.1-1.6 cm in

diameter.  There is an attached portion of mesoappendix measuring 4.3 x

1.1 x 0.5 cm.  The proximal margin is closed with a staple line.  The

serosa is pink-red and focally hemorrhagic.  The specimen is serially

sectioned to reveal a dilated lumen ranging from 0.5-0.9 cm.  A tan-brown

fecalith is present in the distal tip measuring 1.3 cm in greatest

dimension.  No perforations are identified.  Representative sections are

submitted in cassette A1-A2, with the proximal margin inked black. ([facility];

2/21/2019)

SYC/SYC

.                                                                02

Pathologist provided ICD-10:

K35.80

.                                                                02

CPT                                                        .

174734

Specimen Comment: A courtesy copy of this report has been sent to

Specimen Comment: [phone], [phone].

Specimen Comment: Report sent to  / DR STARR

Specimen Comment: A duplicate report has been generated due to demographic updates.

***Performed at:  01

   [address], [place], [place]  [id number]

   MD Enzo Bush MD Phone:  [phone]

***Performed at:  02

   [address]  [id number]

   MD Bg Jauregui MD Phone:  [phone]

## 2019-02-22 NOTE — NUR
SW reviewed pt's medical chart and evaluated for dc needs. Pt is active duty  and 
was admitted for appendicitis. There are no dc needs at this time. SW will be available if 
pt's condition changes and dc planning is required.

## 2019-02-22 NOTE — PDOC
KYLER REN APRN 2/22/19 0944:


SURGICAL PROGRESS NOTE


Subjective


tolerating diet


pain managed


Vital Signs





Vital Signs








  Date Time  Temp Pulse Resp B/P (MAP) Pulse Ox O2 Delivery O2 Flow Rate FiO2


 


2/22/19 09:21      Room Air  


 


2/22/19 07:00 97.9 57 16 119/57 (77) 98   





 97.9       


 


2/21/19 12:36       10.0 








I&O











Intake and Output 


 


 2/22/19





 07:00


 


Intake Total 2080 ml


 


Output Total 1010 ml


 


Balance 1070 ml


 


 


 


Intake Oral 1230 ml


 


IV Total 850 ml


 


Output Urine Total 1000 ml


 


Estimated Blood Loss 10 ml


 


# Voids 3








General:  Alert, Oriented X3, Cooperative, No acute distress


Abdomen:  Soft, No tenderness


Labs





Laboratory Tests








Test


 2/22/19


05:30


 


White Blood Count


 14.4 x10^3/uL


(4.0-11.0)


 


Red Blood Count


 4.39 x10^6/uL


(4.30-5.70)


 


Hemoglobin


 13.0 g/dL


(13.0-17.5)


 


Hematocrit


 39.5 %


(39.0-53.0)


 


Mean Corpuscular Volume 90 fL () 


 


Mean Corpuscular Hemoglobin 30 pg (25-35) 


 


Mean Corpuscular Hemoglobin


Concent 33 g/dL


(31-37)


 


Red Cell Distribution Width


 13.2 %


(11.5-14.5)


 


Platelet Count


 272 x10^3/uL


(140-400)


 


Neutrophils (%) (Auto) 85 % (31-73) 


 


Lymphocytes (%) (Auto) 8 % (24-48) 


 


Monocytes (%) (Auto) 7 % (0-9) 


 


Eosinophils (%) (Auto) 0 % (0-3) 


 


Basophils (%) (Auto) 0 % (0-3) 


 


Neutrophils # (Auto)


 12.2 x10^3uL


(1.8-7.7)


 


Lymphocytes # (Auto)


 1.1 x10^3/uL


(1.0-4.8)


 


Monocytes # (Auto)


 1.0 x10^3/uL


(0.0-1.1)


 


Eosinophils # (Auto)


 0.0 x10^3/uL


(0.0-0.7)


 


Basophils # (Auto)


 0.0 x10^3/uL


(0.0-0.2)








Laboratory Tests








Test


 2/22/19


05:30


 


White Blood Count


 14.4 x10^3/uL


(4.0-11.0)


 


Red Blood Count


 4.39 x10^6/uL


(4.30-5.70)


 


Hemoglobin


 13.0 g/dL


(13.0-17.5)


 


Hematocrit


 39.5 %


(39.0-53.0)


 


Mean Corpuscular Volume 90 fL () 


 


Mean Corpuscular Hemoglobin 30 pg (25-35) 


 


Mean Corpuscular Hemoglobin


Concent 33 g/dL


(31-37)


 


Red Cell Distribution Width


 13.2 %


(11.5-14.5)


 


Platelet Count


 272 x10^3/uL


(140-400)


 


Neutrophils (%) (Auto) 85 % (31-73) 


 


Lymphocytes (%) (Auto) 8 % (24-48) 


 


Monocytes (%) (Auto) 7 % (0-9) 


 


Eosinophils (%) (Auto) 0 % (0-3) 


 


Basophils (%) (Auto) 0 % (0-3) 


 


Neutrophils # (Auto)


 12.2 x10^3uL


(1.8-7.7)


 


Lymphocytes # (Auto)


 1.1 x10^3/uL


(1.0-4.8)


 


Monocytes # (Auto)


 1.0 x10^3/uL


(0.0-1.1)


 


Eosinophils # (Auto)


 0.0 x10^3/uL


(0.0-0.7)


 


Basophils # (Auto)


 0.0 x10^3/uL


(0.0-0.2)








Assessment/Plan


s/p appy


wbc 14, continue iv abx today


cbc in am





CINDY SCHMITZ MD 2/22/19 1124:


SURGICAL PROGRESS NOTE


Assessment/Plan


Patient doing tolerating diet white count 14,000 continue IV antibiotics 

recheck CBC area and agree with Diego assessment and plan











KYLER REN Feb 22, 2019 09:44


CINDY SCHMITZ MD Feb 22, 2019 11:24

## 2019-02-22 NOTE — PDOC
PROGRESS NOTES


Chief Complaint


Chief Complaint


acute appendicitis, 


leukocytosis w.o. SIRS, ,  cont IV abx,     monitor in hospital until tomorrow





Vitals


Vitals





Vital Signs








  Date Time  Temp Pulse Resp B/P (MAP) Pulse Ox O2 Delivery O2 Flow Rate FiO2


 


2/22/19 11:00 98.1 47 16 113/66 (82) 99 Room Air  





 98.1       


 


2/21/19 12:36       10.0 











Physical Exam


General:  Alert, Oriented X3, Cooperative, No acute distress


Abdomen:  Soft, No tenderness


Extremities:  No clubbing, No cyanosis, No edema, Normal pulses


Skin:  No significant lesion





Labs


LABS





Laboratory Tests








Test


 2/22/19


05:30


 


White Blood Count


 14.4 x10^3/uL


(4.0-11.0)


 


Red Blood Count


 4.39 x10^6/uL


(4.30-5.70)


 


Hemoglobin


 13.0 g/dL


(13.0-17.5)


 


Hematocrit


 39.5 %


(39.0-53.0)


 


Mean Corpuscular Volume 90 fL () 


 


Mean Corpuscular Hemoglobin 30 pg (25-35) 


 


Mean Corpuscular Hemoglobin


Concent 33 g/dL


(31-37)


 


Red Cell Distribution Width


 13.2 %


(11.5-14.5)


 


Platelet Count


 272 x10^3/uL


(140-400)


 


Neutrophils (%) (Auto) 85 % (31-73) 


 


Lymphocytes (%) (Auto) 8 % (24-48) 


 


Monocytes (%) (Auto) 7 % (0-9) 


 


Eosinophils (%) (Auto) 0 % (0-3) 


 


Basophils (%) (Auto) 0 % (0-3) 


 


Neutrophils # (Auto)


 12.2 x10^3uL


(1.8-7.7)


 


Lymphocytes # (Auto)


 1.1 x10^3/uL


(1.0-4.8)


 


Monocytes # (Auto)


 1.0 x10^3/uL


(0.0-1.1)


 


Eosinophils # (Auto)


 0.0 x10^3/uL


(0.0-0.7)


 


Basophils # (Auto)


 0.0 x10^3/uL


(0.0-0.2)











Comment


Review of Relevant


I have reviewed the following items drew (where applicable) has been applied.


Labs





Laboratory Tests








Test


 2/22/19


05:30


 


White Blood Count


 14.4 x10^3/uL


(4.0-11.0)


 


Red Blood Count


 4.39 x10^6/uL


(4.30-5.70)


 


Hemoglobin


 13.0 g/dL


(13.0-17.5)


 


Hematocrit


 39.5 %


(39.0-53.0)


 


Mean Corpuscular Volume 90 fL () 


 


Mean Corpuscular Hemoglobin 30 pg (25-35) 


 


Mean Corpuscular Hemoglobin


Concent 33 g/dL


(31-37)


 


Red Cell Distribution Width


 13.2 %


(11.5-14.5)


 


Platelet Count


 272 x10^3/uL


(140-400)


 


Neutrophils (%) (Auto) 85 % (31-73) 


 


Lymphocytes (%) (Auto) 8 % (24-48) 


 


Monocytes (%) (Auto) 7 % (0-9) 


 


Eosinophils (%) (Auto) 0 % (0-3) 


 


Basophils (%) (Auto) 0 % (0-3) 


 


Neutrophils # (Auto)


 12.2 x10^3uL


(1.8-7.7)


 


Lymphocytes # (Auto)


 1.1 x10^3/uL


(1.0-4.8)


 


Monocytes # (Auto)


 1.0 x10^3/uL


(0.0-1.1)


 


Eosinophils # (Auto)


 0.0 x10^3/uL


(0.0-0.7)


 


Basophils # (Auto)


 0.0 x10^3/uL


(0.0-0.2)








Laboratory Tests








Test


 2/22/19


05:30


 


White Blood Count


 14.4 x10^3/uL


(4.0-11.0)


 


Red Blood Count


 4.39 x10^6/uL


(4.30-5.70)


 


Hemoglobin


 13.0 g/dL


(13.0-17.5)


 


Hematocrit


 39.5 %


(39.0-53.0)


 


Mean Corpuscular Volume 90 fL () 


 


Mean Corpuscular Hemoglobin 30 pg (25-35) 


 


Mean Corpuscular Hemoglobin


Concent 33 g/dL


(31-37)


 


Red Cell Distribution Width


 13.2 %


(11.5-14.5)


 


Platelet Count


 272 x10^3/uL


(140-400)


 


Neutrophils (%) (Auto) 85 % (31-73) 


 


Lymphocytes (%) (Auto) 8 % (24-48) 


 


Monocytes (%) (Auto) 7 % (0-9) 


 


Eosinophils (%) (Auto) 0 % (0-3) 


 


Basophils (%) (Auto) 0 % (0-3) 


 


Neutrophils # (Auto)


 12.2 x10^3uL


(1.8-7.7)


 


Lymphocytes # (Auto)


 1.1 x10^3/uL


(1.0-4.8)


 


Monocytes # (Auto)


 1.0 x10^3/uL


(0.0-1.1)


 


Eosinophils # (Auto)


 0.0 x10^3/uL


(0.0-0.7)


 


Basophils # (Auto)


 0.0 x10^3/uL


(0.0-0.2)








Medications





Current Medications


Piperacillin Sod/ Tazobactam Sod 3.375 gm/Sodium Chloride 50 ml @  100 mls/hr 

Q6HRS IV  Last administered on 2/22/19at 12:13;  Start 2/21/19 at 10:00


Ondansetron HCl (Zofran) 4 mg PRN Q6HRS  PRN IV NAUSEA/VOMITING;  Start 2/21/19 

at 09:15;  Stop 2/21/19 at 18:00;  Status DC


Fentanyl Citrate (Fentanyl 2ml Vial) 25 mcg PRN Q5MIN  PRN IV MILD PAIN;  Start 

2/21/19 at 09:15;  Stop 2/21/19 at 18:00;  Status DC


Fentanyl Citrate (Fentanyl 2ml Vial) 50 mcg PRN Q5MIN  PRN IV MODERATE TO 

SEVERE PAIN Last administered on 2/21/19at 12:36;  Start 2/21/19 at 09:15;  

Stop 2/21/19 at 18:00;  Status DC


Morphine Sulfate (Morphine Sulfate) 1 mg PRN Q10MIN  PRN IV SEVERE PAIN;  Start 

2/21/19 at 09:15;  Stop 2/21/19 at 18:00;  Status DC


Ringer's Solution 1,000 ml @  30 mls/hr Q24H IV ;  Start 2/21/19 at 09:08;  

Stop 2/21/19 at 21:07;  Status DC


Lidocaine HCl (Xylocaine-Mpf 1% 2ml Vial) 2 ml 1X PRN  PRN ID IV START;  Start 2 /21/19 at 09:15;  Stop 2/21/19 at 18:00;  Status DC


Hydromorphone HCl (Dilaudid) 0.5 mg PRN Q10MIN  PRN IV SEV PAIN, Second choice;

  Start 2/21/19 at 09:15;  Stop 2/21/19 at 18:00;  Status DC


Prochlorperazine Edisylate (Compazine) 5 mg PACU PRN  PRN IV NAUSEA, MRX1;  

Start 2/21/19 at 09:15;  Stop 2/21/19 at 18:00;  Status DC


Lidocaine HCl (Lidocaine Pf 2% Vial) 5 ml STK-MED ONCE .ROUTE ;  Start 2/21/19 

at 09:28;  Stop 2/21/19 at 09:29;  Status DC


Propofol 20 ml @ As Directed STK-MED ONCE IV ;  Start 2/21/19 at 09:28;  Stop 2/ 21/19 at 09:29;  Status DC


Rocuronium Bromide (Zemuron) 50 mg STK-MED ONCE .ROUTE ;  Start 2/21/19 at 09:29

;  Stop 2/21/19 at 09:30;  Status DC


Fentanyl Citrate (Fentanyl 2ml Vial) 100 mcg STK-MED ONCE .ROUTE ;  Start 2/21/ 19 at 09:29;  Stop 2/21/19 at 09:30;  Status DC


Bupivacaine HCl/ Epinephrine Bitart (Sensorcain-Mpf Epi 0.5%-1:[id number]) 30 ml STK

-MED ONCE .ROUTE  Last administered on 2/21/19at 11:07;  Start 2/21/19 at 10:42

;  Stop 2/21/19 at 10:43;  Status DC


Dexamethasone Sodium Phosphate (Decadron) 20 mg STK-MED ONCE .ROUTE ;  Start 2/ 21/19 at 11:02;  Stop 2/21/19 at 11:03;  Status DC


Ondansetron HCl (Zofran) 4 mg STK-MED ONCE .ROUTE ;  Start 2/21/19 at 11:02;  

Stop 2/21/19 at 11:03;  Status DC


Desflurane (Suprane) 15 ml STK-MED ONCE IH ;  Start 2/21/19 at 11:02;  Stop 2/21 /19 at 11:03;  Status DC


Neostigmine Methylsulfate (Bloxiverz) 10 mg STK-MED ONCE .ROUTE ;  Start 2/21/ 19 at 11:04;  Stop 2/21/19 at 11:05;  Status DC


Glycopyrrolate (Robinul) 1 mg STK-MED ONCE .ROUTE ;  Start 2/21/19 at 11:04;  

Stop 2/21/19 at 11:05;  Status DC


Fentanyl Citrate (Fentanyl 2ml Vial) 100 mcg STK-MED ONCE .ROUTE ;  Start 2/21/ 19 at 11:28;  Stop 2/21/19 at 11:29;  Status DC


Prochlorperazine Edisylate (Compazine) 10 mg STK-MED ONCE .ROUTE ;  Start 2/21/ 19 at 11:28;  Stop 2/21/19 at 11:29;  Status DC


Sodium Chloride (Normal Saline Flush) 3 ml QSHIFT  PRN IV AFTER MEDS AND BLOOD 

DRAWS;  Start 2/21/19 at 11:30


Morphine Sulfate (Morphine Sulfate) 2 mg PRN Q3HRS  PRN IV PAIN;  Start 2/21/19 

at 11:30


Oxycodone/ Acetaminophen (Percocet 5/325) 1 tab PRN Q4HRS  PRN PO MILD PAIN, 

1ST CHOICE;  Start 2/21/19 at 11:30


Oxycodone/ Acetaminophen (Percocet 5/325) 2 tab PRN Q4HRS  PRN PO MODERATE PAIN

, SEVERE PAIN Last administered on 2/22/19at 09:21;  Start 2/21/19 at 11:30


Ketorolac Tromethamine (Toradol 15mg Vial) 15 mg Q6HRS IV  Last administered on 

2/22/19at 12:11;  Start 2/21/19 at 12:00;  Stop 2/23/19 at 11:59


Ondansetron HCl (Zofran) 4 mg PRN Q6HRS  PRN IV NAUSEA, 1ST CHOICE;  Start 2/21/ 19 at 11:30


Dextrose/Lactated Ringer's 1,000 ml @  75 mls/hr [id number] IV ;  Start 2/21/19 at 

11:28;  Stop 2/22/19 at 14:45;  Status DC


Piperacillin Sod/ Tazobactam Sod 3.375 gm/Sodium Chloride 50 ml @  100 mls/hr 

Q6HRS IV ;  Start 2/21/19 at 12:00;  Status Cancel


Docusate Sodium (Colace) 100 mg PRN DAILY  PRN PO CONSTIPATION Last 

administered on 2/22/19at 12:10;  Start 2/22/19 at 12:15


Lactobacillus Rhamnosus (Culturelle) 1 cap BID PO ;  Start 2/22/19 at 21:00


Vitals/I & O





Vital Sign - Last 24 Hours








 2/21/19 2/21/19 2/21/19 2/21/19





 16:00 19:30 20:00 20:57


 


Temp  98.5  





  98.5  


 


Pulse 55 82  


 


Resp 20 16  


 


B/P (MAP) 130/58 (82) 126/65 (85)  


 


Pulse Ox 96 95  95


 


O2 Delivery Room Air Room Air Room Air Room Air


 


    





    





 2/21/19 2/21/19 2/22/19 2/22/19





 21:57 23:21 03:21 07:00


 


Temp  97.8 97.8 97.9





  97.8 97.8 97.9


 


Pulse  55 50 57


 


Resp  16 16 16


 


B/P (MAP)  112/56 (74) 125/44 (71) 119/57 (77)


 


Pulse Ox 95 96 94 98


 


O2 Delivery  Room Air Room Air Room Air


 


    





    





 2/22/19 2/22/19 2/22/19 2/22/19





 08:10 09:21 10:25 11:00


 


Temp    98.1





    98.1


 


Pulse    47


 


Resp    16


 


B/P (MAP)    113/66 (82)


 


Pulse Ox    99


 


O2 Delivery Room Air Room Air Room Air Room Air














Intake and Output   


 


 2/21/19 2/21/19 2/22/19





 15:00 23:00 07:00


 


Intake Total 850 ml 250 ml 980 ml


 


Output Total 10 ml  1000 ml


 


Balance 840 ml 250 ml -20 ml

















VERENICE BISHOP MD Feb 22, 2019 15:46

## 2019-02-23 VITALS — DIASTOLIC BLOOD PRESSURE: 50 MMHG | SYSTOLIC BLOOD PRESSURE: 110 MMHG

## 2019-02-23 VITALS — SYSTOLIC BLOOD PRESSURE: 166 MMHG | DIASTOLIC BLOOD PRESSURE: 61 MMHG

## 2019-02-23 VITALS — SYSTOLIC BLOOD PRESSURE: 120 MMHG | DIASTOLIC BLOOD PRESSURE: 78 MMHG

## 2019-02-23 LAB
ANION GAP SERPL CALC-SCNC: 7 MMOL/L (ref 6–14)
APTT PPP: YELLOW S
BACTERIA #/AREA URNS HPF: 0 /HPF
BASOPHILS # BLD AUTO: 0.1 X10^3/UL (ref 0–0.2)
BASOPHILS NFR BLD: 1 % (ref 0–3)
BILIRUB UR QL STRIP: NEGATIVE
BUN SERPL-MCNC: 16 MG/DL (ref 8–26)
CALCIUM SERPL-MCNC: 8.5 MG/DL (ref 8.5–10.1)
CHLORIDE SERPL-SCNC: 104 MMOL/L (ref 98–107)
CO2 SERPL-SCNC: 30 MMOL/L (ref 21–32)
CREAT SERPL-MCNC: 1.7 MG/DL (ref 0.7–1.3)
EOSINOPHIL NFR BLD: 0.3 X10^3/UL (ref 0–0.7)
EOSINOPHIL NFR BLD: 5 % (ref 0–3)
ERYTHROCYTE [DISTWIDTH] IN BLOOD BY AUTOMATED COUNT: 13.5 % (ref 11.5–14.5)
FIBRINOGEN PPP-MCNC: CLEAR MG/DL
GFR SERPLBLD BASED ON 1.73 SQ M-ARVRAT: 59.2 ML/MIN
GLUCOSE SERPL-MCNC: 92 MG/DL (ref 70–99)
HCT VFR BLD CALC: 37.5 % (ref 39–53)
HGB BLD-MCNC: 12.2 G/DL (ref 13–17.5)
LYMPHOCYTES # BLD: 2 X10^3/UL (ref 1–4.8)
LYMPHOCYTES NFR BLD AUTO: 32 % (ref 24–48)
MCH RBC QN AUTO: 30 PG (ref 25–35)
MCHC RBC AUTO-ENTMCNC: 33 G/DL (ref 31–37)
MCV RBC AUTO: 91 FL (ref 79–100)
MONO #: 0.5 X10^3/UL (ref 0–1.1)
MONOCYTES NFR BLD: 8 % (ref 0–9)
NEUT #: 3.4 X10^3UL (ref 1.8–7.7)
NEUTROPHILS NFR BLD AUTO: 54 % (ref 31–73)
NITRITE UR QL STRIP: NEGATIVE
PH UR STRIP: 7.5 [PH]
PLATELET # BLD AUTO: 249 X10^3/UL (ref 140–400)
POTASSIUM SERPL-SCNC: 4 MMOL/L (ref 3.5–5.1)
PROT UR STRIP-MCNC: NEGATIVE MG/DL
RBC # BLD AUTO: 4.12 X10^6/UL (ref 4.3–5.7)
RBC #/AREA URNS HPF: 0 /HPF (ref 0–2)
SODIUM SERPL-SCNC: 141 MMOL/L (ref 136–145)
SQUAMOUS #/AREA URNS LPF: (no result) /LPF
UROBILINOGEN UR-MCNC: 0.2 MG/DL
WBC # BLD AUTO: 6.3 X10^3/UL (ref 4–11)
WBC #/AREA URNS HPF: (no result) /HPF (ref 0–4)

## 2019-02-23 RX ADMIN — Medication SCH CAP: at 09:00

## 2019-02-23 RX ADMIN — KETOROLAC TROMETHAMINE SCH MG: 15 INJECTION, SOLUTION INTRAMUSCULAR; INTRAVENOUS at 05:51

## 2019-02-23 RX ADMIN — PIPERACILLIN SODIUM AND TAZOBACTAM SODIUM SCH MLS/HR: 3; .375 INJECTION, POWDER, LYOPHILIZED, FOR SOLUTION INTRAVENOUS at 05:50

## 2019-02-23 RX ADMIN — PIPERACILLIN SODIUM AND TAZOBACTAM SODIUM SCH MLS/HR: 3; .375 INJECTION, POWDER, LYOPHILIZED, FOR SOLUTION INTRAVENOUS at 12:09

## 2019-02-23 RX ADMIN — PIPERACILLIN SODIUM AND TAZOBACTAM SODIUM SCH MLS/HR: 3; .375 INJECTION, POWDER, LYOPHILIZED, FOR SOLUTION INTRAVENOUS at 00:09

## 2019-02-23 RX ADMIN — KETOROLAC TROMETHAMINE SCH MG: 15 INJECTION, SOLUTION INTRAMUSCULAR; INTRAVENOUS at 00:09

## 2019-02-23 NOTE — PDOC
SURGICAL PROGRESS NOTE


Subjective


Pt without c/o, twila PO


Vital Signs





Vital Signs








  Date Time  Temp Pulse Resp B/P (MAP) Pulse Ox O2 Delivery O2 Flow Rate FiO2


 


2/23/19 11:00 97.8 66 18 166/61 (96) 98 Room Air  





 97.8       








I&O











Intake and Output 


 


 2/23/19





 06:59


 


Intake Total 1430 ml


 


Balance 1430 ml


 


 


 


Intake Oral 1430 ml


 


# Voids 12








General:  Alert, Oriented X3, Cooperative, No acute distress


Abdomen:  Soft, No tenderness, Other (incision c/d/i)


Labs





Laboratory Tests








Test


 2/22/19


05:30 2/23/19


04:30 2/23/19


10:45


 


White Blood Count


 14.4 x10^3/uL


(4.0-11.0) 6.3 x10^3/uL


(4.0-11.0) 





 


Red Blood Count


 4.39 x10^6/uL


(4.30-5.70) 4.12 x10^6/uL


(4.30-5.70) 





 


Hemoglobin


 13.0 g/dL


(13.0-17.5) 12.2 g/dL


(13.0-17.5) 





 


Hematocrit


 39.5 %


(39.0-53.0) 37.5 %


(39.0-53.0) 





 


Mean Corpuscular Volume 90 fL ()  91 fL ()  


 


Mean Corpuscular Hemoglobin 30 pg (25-35)  30 pg (25-35)  


 


Mean Corpuscular Hemoglobin


Concent 33 g/dL


(31-37) 33 g/dL


(31-37) 





 


Red Cell Distribution Width


 13.2 %


(11.5-14.5) 13.5 %


(11.5-14.5) 





 


Platelet Count


 272 x10^3/uL


(140-400) 249 x10^3/uL


(140-400) 





 


Neutrophils (%) (Auto) 85 % (31-73)  54 % (31-73)  


 


Lymphocytes (%) (Auto) 8 % (24-48)  32 % (24-48)  


 


Monocytes (%) (Auto) 7 % (0-9)  8 % (0-9)  


 


Eosinophils (%) (Auto) 0 % (0-3)  5 % (0-3)  


 


Basophils (%) (Auto) 0 % (0-3)  1 % (0-3)  


 


Neutrophils # (Auto)


 12.2 x10^3uL


(1.8-7.7) 3.4 x10^3uL


(1.8-7.7) 





 


Lymphocytes # (Auto)


 1.1 x10^3/uL


(1.0-4.8) 2.0 x10^3/uL


(1.0-4.8) 





 


Monocytes # (Auto)


 1.0 x10^3/uL


(0.0-1.1) 0.5 x10^3/uL


(0.0-1.1) 





 


Eosinophils # (Auto)


 0.0 x10^3/uL


(0.0-0.7) 0.3 x10^3/uL


(0.0-0.7) 





 


Basophils # (Auto)


 0.0 x10^3/uL


(0.0-0.2) 0.1 x10^3/uL


(0.0-0.2) 





 


Sodium Level


 


 141 mmol/L


(136-145) 





 


Potassium Level


 


 4.0 mmol/L


(3.5-5.1) 





 


Chloride Level


 


 104 mmol/L


() 





 


Carbon Dioxide Level


 


 30 mmol/L


(21-32) 





 


Anion Gap  7 (6-14)  


 


Blood Urea Nitrogen


 


 16 mg/dL


(8-26) 





 


Creatinine


 


 1.7 mg/dL


(0.7-1.3) 





 


Estimated GFR


(Cockcroft-Gault) 


 59.2 


 





 


Glucose Level


 


 92 mg/dL


(70-99) 





 


Calcium Level


 


 8.5 mg/dL


(8.5-10.1) 





 


Urine Color   Yellow 


 


Urine Clarity   Clear 


 


Urine pH   7.5 


 


Urine Specific Gravity   1.010 


 


Urine Protein


 


 


 Negative mg/dL


(NEG-TRACE)


 


Urine Glucose (UA)


 


 


 Negative mg/dL


(NEG)


 


Urine Ketones (Stick)


 


 


 Negative mg/dL


(NEG)


 


Urine Blood   Negative (NEG) 


 


Urine Nitrite   Negative (NEG) 


 


Urine Bilirubin   Negative (NEG) 


 


Urine Urobilinogen Dipstick


 


 


 0.2 mg/dL (0.2


mg/dL)


 


Urine Leukocyte Esterase   Negative (NEG) 


 


Urine RBC   0 /HPF (0-2) 


 


Urine WBC   Occ /HPF (0-4) 


 


Urine Squamous Epithelial


Cells 


 


 Occ /LPF 





 


Urine Bacteria   0 /HPF (0-FEW) 








Laboratory Tests








Test


 2/23/19


04:30 2/23/19


10:45


 


White Blood Count


 6.3 x10^3/uL


(4.0-11.0) 





 


Red Blood Count


 4.12 x10^6/uL


(4.30-5.70) 





 


Hemoglobin


 12.2 g/dL


(13.0-17.5) 





 


Hematocrit


 37.5 %


(39.0-53.0) 





 


Mean Corpuscular Volume 91 fL ()  


 


Mean Corpuscular Hemoglobin 30 pg (25-35)  


 


Mean Corpuscular Hemoglobin


Concent 33 g/dL


(31-37) 





 


Red Cell Distribution Width


 13.5 %


(11.5-14.5) 





 


Platelet Count


 249 x10^3/uL


(140-400) 





 


Neutrophils (%) (Auto) 54 % (31-73)  


 


Lymphocytes (%) (Auto) 32 % (24-48)  


 


Monocytes (%) (Auto) 8 % (0-9)  


 


Eosinophils (%) (Auto) 5 % (0-3)  


 


Basophils (%) (Auto) 1 % (0-3)  


 


Neutrophils # (Auto)


 3.4 x10^3uL


(1.8-7.7) 





 


Lymphocytes # (Auto)


 2.0 x10^3/uL


(1.0-4.8) 





 


Monocytes # (Auto)


 0.5 x10^3/uL


(0.0-1.1) 





 


Eosinophils # (Auto)


 0.3 x10^3/uL


(0.0-0.7) 





 


Basophils # (Auto)


 0.1 x10^3/uL


(0.0-0.2) 





 


Sodium Level


 141 mmol/L


(136-145) 





 


Potassium Level


 4.0 mmol/L


(3.5-5.1) 





 


Chloride Level


 104 mmol/L


() 





 


Carbon Dioxide Level


 30 mmol/L


(21-32) 





 


Anion Gap 7 (6-14)  


 


Blood Urea Nitrogen


 16 mg/dL


(8-26) 





 


Creatinine


 1.7 mg/dL


(0.7-1.3) 





 


Estimated GFR


(Cockcroft-Gault) 59.2 


 





 


Glucose Level


 92 mg/dL


(70-99) 





 


Calcium Level


 8.5 mg/dL


(8.5-10.1) 





 


Urine Color  Yellow 


 


Urine Clarity  Clear 


 


Urine pH  7.5 


 


Urine Specific Gravity  1.010 


 


Urine Protein


 


 Negative mg/dL


(NEG-TRACE)


 


Urine Glucose (UA)


 


 Negative mg/dL


(NEG)


 


Urine Ketones (Stick)


 


 Negative mg/dL


(NEG)


 


Urine Blood  Negative (NEG) 


 


Urine Nitrite  Negative (NEG) 


 


Urine Bilirubin  Negative (NEG) 


 


Urine Urobilinogen Dipstick


 


 0.2 mg/dL (0.2


mg/dL)


 


Urine Leukocyte Esterase  Negative (NEG) 


 


Urine RBC  0 /HPF (0-2) 


 


Urine WBC  Occ /HPF (0-4) 


 


Urine Squamous Epithelial


Cells 


 Occ /LPF 





 


Urine Bacteria  0 /HPF (0-FEW) 








Problem List


s/p lap appendectomy


appears to be doing well


OK to d/c from surgery POV and scripts on chart


defer additional evaluation of elevated cr to primary, but suspect dehydration











TIKA PRETTY MD Feb 23, 2019 13:47

## 2019-02-23 NOTE — PDOC3
Discharge Summary


Visit Information


Date of Admission:  Feb 21, 2019


Date of Discharge:  Feb 23, 2019


Admitting Diagnosis Comment:


Acute appendicitis


Final Diagnosis


Acute appendicitis status post appendectomy


Prerenal azotemia





Brief Hospital Course


Allergies





 Allergies








Coded Allergies Type Severity Reaction Last Updated Verified


 


  No Known Drug Allergies    2/21/19 No








Vital Signs





Vital Signs








  Date Time  Temp Pulse Resp B/P (MAP) Pulse Ox O2 Delivery O2 Flow Rate FiO2


 


2/23/19 11:00 97.8 66 18 166/61 (96) 98 Room Air  





 97.8       








Lab Results





Laboratory Tests








Test


 2/22/19


05:30 2/23/19


04:30 2/23/19


10:45


 


White Blood Count


 14.4 x10^3/uL


(4.0-11.0) 6.3 x10^3/uL


(4.0-11.0) 





 


Red Blood Count


 4.39 x10^6/uL


(4.30-5.70) 4.12 x10^6/uL


(4.30-5.70) 





 


Hemoglobin


 13.0 g/dL


(13.0-17.5) 12.2 g/dL


(13.0-17.5) 





 


Hematocrit


 39.5 %


(39.0-53.0) 37.5 %


(39.0-53.0) 





 


Mean Corpuscular Volume 90 fL ()  91 fL ()  


 


Mean Corpuscular Hemoglobin 30 pg (25-35)  30 pg (25-35)  


 


Mean Corpuscular Hemoglobin


Concent 33 g/dL


(31-37) 33 g/dL


(31-37) 





 


Red Cell Distribution Width


 13.2 %


(11.5-14.5) 13.5 %


(11.5-14.5) 





 


Platelet Count


 272 x10^3/uL


(140-400) 249 x10^3/uL


(140-400) 





 


Neutrophils (%) (Auto) 85 % (31-73)  54 % (31-73)  


 


Lymphocytes (%) (Auto) 8 % (24-48)  32 % (24-48)  


 


Monocytes (%) (Auto) 7 % (0-9)  8 % (0-9)  


 


Eosinophils (%) (Auto) 0 % (0-3)  5 % (0-3)  


 


Basophils (%) (Auto) 0 % (0-3)  1 % (0-3)  


 


Neutrophils # (Auto)


 12.2 x10^3uL


(1.8-7.7) 3.4 x10^3uL


(1.8-7.7) 





 


Lymphocytes # (Auto)


 1.1 x10^3/uL


(1.0-4.8) 2.0 x10^3/uL


(1.0-4.8) 





 


Monocytes # (Auto)


 1.0 x10^3/uL


(0.0-1.1) 0.5 x10^3/uL


(0.0-1.1) 





 


Eosinophils # (Auto)


 0.0 x10^3/uL


(0.0-0.7) 0.3 x10^3/uL


(0.0-0.7) 





 


Basophils # (Auto)


 0.0 x10^3/uL


(0.0-0.2) 0.1 x10^3/uL


(0.0-0.2) 





 


Sodium Level


 


 141 mmol/L


(136-145) 





 


Potassium Level


 


 4.0 mmol/L


(3.5-5.1) 





 


Chloride Level


 


 104 mmol/L


() 





 


Carbon Dioxide Level


 


 30 mmol/L


(21-32) 





 


Anion Gap  7 (6-14)  


 


Blood Urea Nitrogen


 


 16 mg/dL


(8-26) 





 


Creatinine


 


 1.7 mg/dL


(0.7-1.3) 





 


Estimated GFR


(Cockcroft-Gault) 


 59.2 


 





 


Glucose Level


 


 92 mg/dL


(70-99) 





 


Calcium Level


 


 8.5 mg/dL


(8.5-10.1) 





 


Urine Color   Yellow 


 


Urine Clarity   Clear 


 


Urine pH   7.5 


 


Urine Specific Gravity   1.010 


 


Urine Protein


 


 


 Negative mg/dL


(NEG-TRACE)


 


Urine Glucose (UA)


 


 


 Negative mg/dL


(NEG)


 


Urine Ketones (Stick)


 


 


 Negative mg/dL


(NEG)


 


Urine Blood   Negative (NEG) 


 


Urine Nitrite   Negative (NEG) 


 


Urine Bilirubin   Negative (NEG) 


 


Urine Urobilinogen Dipstick


 


 


 0.2 mg/dL (0.2


mg/dL)


 


Urine Leukocyte Esterase   Negative (NEG) 


 


Urine RBC   0 /HPF (0-2) 


 


Urine WBC   Occ /HPF (0-4) 


 


Urine Squamous Epithelial


Cells 


 


 Occ /LPF 





 


Urine Bacteria   0 /HPF (0-FEW) 








Laboratory Tests








Test


 2/23/19


04:30 2/23/19


10:45


 


White Blood Count


 6.3 x10^3/uL


(4.0-11.0) 





 


Red Blood Count


 4.12 x10^6/uL


(4.30-5.70) 





 


Hemoglobin


 12.2 g/dL


(13.0-17.5) 





 


Hematocrit


 37.5 %


(39.0-53.0) 





 


Mean Corpuscular Volume 91 fL ()  


 


Mean Corpuscular Hemoglobin 30 pg (25-35)  


 


Mean Corpuscular Hemoglobin


Concent 33 g/dL


(31-37) 





 


Red Cell Distribution Width


 13.5 %


(11.5-14.5) 





 


Platelet Count


 249 x10^3/uL


(140-400) 





 


Neutrophils (%) (Auto) 54 % (31-73)  


 


Lymphocytes (%) (Auto) 32 % (24-48)  


 


Monocytes (%) (Auto) 8 % (0-9)  


 


Eosinophils (%) (Auto) 5 % (0-3)  


 


Basophils (%) (Auto) 1 % (0-3)  


 


Neutrophils # (Auto)


 3.4 x10^3uL


(1.8-7.7) 





 


Lymphocytes # (Auto)


 2.0 x10^3/uL


(1.0-4.8) 





 


Monocytes # (Auto)


 0.5 x10^3/uL


(0.0-1.1) 





 


Eosinophils # (Auto)


 0.3 x10^3/uL


(0.0-0.7) 





 


Basophils # (Auto)


 0.1 x10^3/uL


(0.0-0.2) 





 


Sodium Level


 141 mmol/L


(136-145) 





 


Potassium Level


 4.0 mmol/L


(3.5-5.1) 





 


Chloride Level


 104 mmol/L


() 





 


Carbon Dioxide Level


 30 mmol/L


(21-32) 





 


Anion Gap 7 (6-14)  


 


Blood Urea Nitrogen


 16 mg/dL


(8-26) 





 


Creatinine


 1.7 mg/dL


(0.7-1.3) 





 


Estimated GFR


(Cockcroft-Gault) 59.2 


 





 


Glucose Level


 92 mg/dL


(70-99) 





 


Calcium Level


 8.5 mg/dL


(8.5-10.1) 





 


Urine Color  Yellow 


 


Urine Clarity  Clear 


 


Urine pH  7.5 


 


Urine Specific Gravity  1.010 


 


Urine Protein


 


 Negative mg/dL


(NEG-TRACE)


 


Urine Glucose (UA)


 


 Negative mg/dL


(NEG)


 


Urine Ketones (Stick)


 


 Negative mg/dL


(NEG)


 


Urine Blood  Negative (NEG) 


 


Urine Nitrite  Negative (NEG) 


 


Urine Bilirubin  Negative (NEG) 


 


Urine Urobilinogen Dipstick


 


 0.2 mg/dL (0.2


mg/dL)


 


Urine Leukocyte Esterase  Negative (NEG) 


 


Urine RBC  0 /HPF (0-2) 


 


Urine WBC  Occ /HPF (0-4) 


 


Urine Squamous Epithelial


Cells 


 Occ /LPF 





 


Urine Bacteria  0 /HPF (0-FEW) 








Brief Hospital Course


Mr. Colvin  is a 28 old male who presented with abdominal discomfort and an 

outside facility. The patient was evaluated in the emergency department 

attending Shlomo was diagnosed with acute appendicitis. He was transferred to 

our facility for definitive treatment with appendectomy. The patient underwent 

the procedure and tolerated it well. Patient recovered well from it, there was 

a creatinine level that was 1.7 which seemed to be slightly out of range. Most 

likely secondary to prerenal septemia. The patient also had some Toradol given 

as part of his pain management [place] which may have caused his slight bump in 

his creatinine. The patient continued to have good urinary output no back pain 

no urinary symptoms were reported. Patient understands and acknowledges all the 

instructions prior to dismissal. Signs and symptoms of alarm discussed with the 

patient prior to discharge reassurances been provided. I have encourage him to 

[place] and have a BMP drawn on Monday as 

well. In good spirits to be discharged home on concerns address to the best of 

my abilities


Physical exam:


Lungs clear to auscultation with good inspiratory effort


Cardia vascular S1-S2 regular rhythm no murmurs or rubs





Discharge Information


Condition at Discharge:  Improved


Follow Up:  Weeks


Disposition/Orders:  D/C to Home


Scheduled


Cefdinir (Cefdinir) 300 Mg Capsule, 1 CAP PO BID for appendicitis for 5 Days, #

10


   Prescribed by: HEMALATHA FUENTES MD on 2/23/19 1243


Ondansetron Hcl (Zofran) 4 Mg Tablet, 1 TAB PO Q6HRS for nausea for 7 Days, #28


   Prescribed by: HEMALATHA FUENTES MD on 2/23/19 1243





Scheduled PRN


Oxycodone/Apap 5-325 (Percocet 5-325 Mg Tablet **) 1 Each Tablet, 1 TAB PO PRN 

Q4HRS PRN for MILD PAIN, 1ST CHOICE for 3 Days, #12


   Prescribed by: HEMALATHA FUENTES MD on 2/23/19 1243











HEMALATHA FUENTES MD Feb 23, 2019 15:49

## 2019-02-23 NOTE — NUR
Pt discharged home with no additional services. Ambulated to hospital entrance accompanied 
by friends. Discharge education and information regarding Dx, elevated creatinine, new 
medications, hydration and follow up information provided to Pt who verbalized understanding 
and had no further questions. No changes from previous assessment.